# Patient Record
Sex: MALE | Race: WHITE | NOT HISPANIC OR LATINO | Employment: UNEMPLOYED | URBAN - METROPOLITAN AREA
[De-identification: names, ages, dates, MRNs, and addresses within clinical notes are randomized per-mention and may not be internally consistent; named-entity substitution may affect disease eponyms.]

---
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
## 2023-11-05 ENCOUNTER — OFFICE VISIT (OUTPATIENT)
Dept: URGENT CARE | Facility: CLINIC | Age: 23
End: 2023-11-05
Payer: COMMERCIAL

## 2023-11-05 ENCOUNTER — APPOINTMENT (OUTPATIENT)
Dept: RADIOLOGY | Facility: CLINIC | Age: 23
End: 2023-11-05
Payer: COMMERCIAL

## 2023-11-05 VITALS
HEART RATE: 65 BPM | HEIGHT: 68 IN | DIASTOLIC BLOOD PRESSURE: 81 MMHG | SYSTOLIC BLOOD PRESSURE: 127 MMHG | BODY MASS INDEX: 25.01 KG/M2 | RESPIRATION RATE: 18 BRPM | TEMPERATURE: 97.7 F | OXYGEN SATURATION: 97 % | WEIGHT: 165 LBS

## 2023-11-05 DIAGNOSIS — M79.671 RIGHT FOOT PAIN: Primary | ICD-10-CM

## 2023-11-05 DIAGNOSIS — M79.671 RIGHT FOOT PAIN: ICD-10-CM

## 2023-11-05 DIAGNOSIS — S92.521A CLOSED DISPLACED FRACTURE OF MIDDLE PHALANX OF LESSER TOE OF RIGHT FOOT, INITIAL ENCOUNTER: ICD-10-CM

## 2023-11-05 PROCEDURE — 73630 X-RAY EXAM OF FOOT: CPT

## 2023-11-05 PROCEDURE — 99213 OFFICE O/P EST LOW 20 MIN: CPT

## 2023-11-05 NOTE — PATIENT INSTRUCTIONS
Keep the toe buddy taped for support  Wear a hard soled shoe for support. Apply ice for 20 minutes every 1-2 hours while awake.    Take ibuprofen as needed for pain  Follow-up with orthopedic

## 2023-11-05 NOTE — PROGRESS NOTES
[facility]        NAME: Savage Romero is a 21 y.o. male  : 2000    MRN: [id number]  DATE: 2023  TIME: 3:09 PM    Assessment and Plan   Right foot pain [M79.671]  1. Right foot pain  XR foot 3+ vw right      2. Closed displaced fracture of middle phalanx of lesser toe of right foot, initial encounter  Ambulatory Referral to Orthopedic Surgery            Patient Instructions   Keep the toe buddy taped for support  Wear a hard soled shoe for support. Apply ice for 20 minutes every 1-2 hours while awake. Take ibuprofen as needed for pain  Follow-up with orthopedic    Follow up with PCP in 3-5 days. Proceed to  ER if symptoms worsen. Chief Complaint     Chief Complaint   Patient presents with    Toe Pain     Pt reports kicking a box full of tiles with his R foot, injuring his right second toe. History of Present Illness       Patient is a 19YOM presenting with right second toe pain. He states he kicked a box full of tiles this morning and has had increased pain and swelling in his right second toe ever since. Toe Pain         Review of Systems   Review of Systems   Musculoskeletal:  Positive for joint swelling.          Current Medications       Current Outpatient Medications:     predniSONE 10 mg tablet, Take once daily all days pills on this schedule 6- 6- 5- 4- 3- 2- 1 (Patient not taking: Reported on 2019), Disp: 27 tablet, Rfl: 0    Current Allergies     Allergies as of 2023 - Reviewed 2023   Allergen Reaction Noted    Cat hair extract  2019    Dog epithelium  2019            The following portions of the patient's history were reviewed and updated as appropriate: allergies, current medications, past family history, past medical history, past social history, past surgical history and problem list.     Past Medical History:   Diagnosis Date    No known problems        Past Surgical History:   Procedure Laterality Date    NO PAST SURGERIES      TONSILLECTOMY      WISDOM TOOTH EXTRACTION         Family History   Problem Relation Age of Onset    No Known Problems Mother     Diabetes Father          Medications have been verified. Objective   /81   Pulse 65   Temp 97.7 °F (36.5 °C)   Resp 18   Ht 5' 8" (1.727 m)   Wt 74.8 kg (165 lb)   SpO2 97%   BMI 25.09 kg/m²        Physical Exam     Physical Exam  Vitals and nursing note reviewed. Constitutional:       General: He is not in acute distress. Appearance: Normal appearance. He is normal weight. He is not ill-appearing or toxic-appearing. Musculoskeletal:        Legs:    Neurological:      Mental Status: He is alert.

## 2023-11-07 ENCOUNTER — TELEPHONE (OUTPATIENT)
Age: 23
End: 2023-11-07

## 2023-11-07 NOTE — TELEPHONE ENCOUNTER
Caller: Eda Gentile    Doctor/Office: Dr. Bk Yates    #: [phone]    Escalation: Appointment Patient has a broken toe and is requesting a forced appt. Please return call.  Thank you

## 2023-11-16 RX ORDER — LEVOTHYROXINE SODIUM 0.07 MG/1
75 TABLET ORAL
COMMUNITY

## 2023-11-16 RX ORDER — FEXOFENADINE HCL 180 MG/1
180 TABLET ORAL DAILY PRN
COMMUNITY

## 2023-11-20 ENCOUNTER — OFFICE VISIT (OUTPATIENT)
Dept: PODIATRY | Age: 23
End: 2023-11-20
Payer: COMMERCIAL

## 2023-11-20 ENCOUNTER — HOSPITAL ENCOUNTER (OUTPATIENT)
Dept: RADIOLOGY | Facility: CLINIC | Age: 23
Discharge: HOME | End: 2023-11-20
Payer: COMMERCIAL

## 2023-11-20 VITALS
OXYGEN SATURATION: 97 % | SYSTOLIC BLOOD PRESSURE: 112 MMHG | DIASTOLIC BLOOD PRESSURE: 76 MMHG | TEMPERATURE: 98.4 F | HEART RATE: 77 BPM | WEIGHT: 171.6 LBS | HEIGHT: 69 IN | BODY MASS INDEX: 25.42 KG/M2

## 2023-11-20 DIAGNOSIS — S92.514A CLOSED NONDISPLACED FRACTURE OF PROXIMAL PHALANX OF LESSER TOE OF RIGHT FOOT, INITIAL ENCOUNTER: ICD-10-CM

## 2023-11-20 DIAGNOSIS — S92.521A CLOSED DISPLACED FRACTURE OF MIDDLE PHALANX OF LESSER TOE OF RIGHT FOOT, INITIAL ENCOUNTER: ICD-10-CM

## 2023-11-20 PROCEDURE — 99203 OFFICE O/P NEW LOW 30 MIN: CPT | Performed by: STUDENT IN AN ORGANIZED HEALTH CARE EDUCATION/TRAINING PROGRAM

## 2023-11-20 PROCEDURE — 73630 X-RAY EXAM OF FOOT: CPT

## 2023-11-20 NOTE — PROGRESS NOTES
Assessment/Plan:     Diagnoses and all orders for this visit:    Closed nondisplaced fracture of proximal phalanx of lesser toe of right foot, initial encounter  -     Ambulatory Referral to Orthopedic Surgery  -     X-ray foot right 3+ views; Future    Other orders  -     levothyroxine 75 mcg tablet; Take 75 mcg by mouth daily in the early morning (Patient not taking: Reported on 11/20/2023)  -     fexofenadine (ALLEGRA) 180 MG tablet; Take 180 mg by mouth daily as needed  -     aspirin-acetaminophen-caffeine (EXCEDRIN MIGRAINE) 550-588-73 MG per tablet; Take 1 tablet by mouth every 6 (six) hours as needed for headaches          Imaging Reviewed at this visit (I personally reviewed/independently interpreted images and reports in PACS)  XR right foot NWB 3v 11/5/23: No acute osseous abnormalities noted per radiology read however I do note subtle radiolucent line to head of proximal phalanx 2nd toe likely indicating nondisplaced fracture. XR right foot WB 3v 11/20/23: blurring of fracture line to head of proximal phalanx 2nd toe     IMPRESSION:  Right foot injury (DOI 11/5/23) with nondisplaced fracture of right 2nd toe proximal phalanx     PLAN:  I reviewed clinical exam and radiographic imaging (XR) with patient in detail today. I have discussed with the patient the pathophysiology of this diagnosis and reviewed how the examination correlates with this diagnosis. I reviewed urgent care note from 11/5/23  I discussed Xrs as above with patient. I explained healing timeline of toe fractures  C/w buddy taping and WBAT surgical shoe until his pain has improved enough to wear regular sneaker. I gave stiff toed sneaker recs for him today. RICE   F/u 6 weeks if pain still present, WB XR     Subjective:      Patient ID: Clarisa Key is a 21 y.o. male. Natacha Reyes presents to clinic today concerning possible right toe fracture. Notes he kicked a box around 11/5/23 and has pain since.  Went to urgent care 11/5/23 and XR taken w/ possible fracture right 2nd toe. Pain improving a bit and wearing surgical shoe. No pain in shoe, pain with barefoot WB. The following portions of the patient's history were reviewed and updated as appropriate: allergies, current medications, past family history, past medical history, past social history, past surgical history, and problem list.    Review of Systems   Constitutional:  Negative for activity change, chills and fever. HENT: Negative. Respiratory:  Negative for cough, chest tightness and shortness of breath. Cardiovascular:  Negative for chest pain and leg swelling. Endocrine: Negative. Genitourinary: Negative. Musculoskeletal:         R2 pain   Neurological: Negative. Negative for numbness. Psychiatric/Behavioral: Negative. Negative for agitation and behavioral problems. Objective:      /76   Pulse 77   Temp 98.4 °F (36.9 °C) (Temporal)   Ht 5' 9" (1.753 m)   Wt 77.8 kg (171 lb 9.6 oz)   SpO2 97%   BMI 25.34 kg/m²          Physical Exam  Constitutional:       General: He is not in acute distress. Appearance: Normal appearance. He is not ill-appearing. Cardiovascular:      Pulses: Normal pulses. Comments: Bilateral DP & PT pulses 2/4. CRT WNL. Pedal hair present. Feet warm and well perfused. Pulmonary:      Effort: No respiratory distress. Musculoskeletal:         General: Tenderness (Right 2nd toe at DIPJ and proximal phalanx head. Soreness to dorsal foot bruising.) present. Comments: Bilateral ankle, STJ, TNJ, TMTJ, MTPJ ROM WNL and without pain. LE muscle strength WNL. Skin:     General: Skin is warm. Capillary Refill: Capillary refill takes less than 2 seconds. Findings: Bruising (Resolving right dorsal foot.) present. No erythema or lesion. Neurological:      General: No focal deficit present. Mental Status: He is alert and oriented to person, place, and time. Sensory: No sensory deficit. Comments: Gross sensation intact. Denies N/T/B to B/L feet.     Psychiatric:         Mood and Affect: Mood normal.         Behavior: Behavior normal.

## 2023-11-20 NOTE — PATIENT INSTRUCTIONS
Wear supportive shoes at all times. Avoid flip-flops, flat sandals, barefoot walking (never walk barefoot, even at home). Generally avoid shoes that are too flexible and bend in the arch. Your shoes should only slightly bend in the toe area, not the middle. Running sneakers are often the best choice. Supportive sneaker brands: Herrera Mack, [address] [facility], [facility], [address] [facility] (discount if mention Dr referred)  [address] [place] [address] Running [employer] [place]  Supportive daily shoe brands: Vionic, SUND, [place], Dansko, [place], [place], [place], [employer]  Supportive home shoes:  Shikha Polanco (recovery slides)

## 2024-01-03 ENCOUNTER — OFFICE VISIT (OUTPATIENT)
Dept: PODIATRY | Age: 24
End: 2024-01-03
Payer: COMMERCIAL

## 2024-01-03 ENCOUNTER — HOSPITAL ENCOUNTER (OUTPATIENT)
Dept: RADIOLOGY | Facility: CLINIC | Age: 24
Discharge: HOME | End: 2024-01-03
Payer: COMMERCIAL

## 2024-01-03 VITALS
WEIGHT: 170.2 LBS | BODY MASS INDEX: 25.21 KG/M2 | HEIGHT: 69 IN | OXYGEN SATURATION: 97 % | DIASTOLIC BLOOD PRESSURE: 88 MMHG | TEMPERATURE: 98 F | HEART RATE: 62 BPM | SYSTOLIC BLOOD PRESSURE: 118 MMHG

## 2024-01-03 DIAGNOSIS — S92.514D CLOSED NONDISPLACED FRACTURE OF PROXIMAL PHALANX OF LESSER TOE OF RIGHT FOOT WITH ROUTINE HEALING, SUBSEQUENT ENCOUNTER: Primary | ICD-10-CM

## 2024-01-03 DIAGNOSIS — S92.514A CLOSED NONDISPLACED FRACTURE OF PROXIMAL PHALANX OF LESSER TOE OF RIGHT FOOT, INITIAL ENCOUNTER: ICD-10-CM

## 2024-01-03 PROCEDURE — 73630 X-RAY EXAM OF FOOT: CPT

## 2024-01-03 PROCEDURE — 99212 OFFICE O/P EST SF 10 MIN: CPT | Performed by: STUDENT IN AN ORGANIZED HEALTH CARE EDUCATION/TRAINING PROGRAM

## 2024-01-03 NOTE — PROGRESS NOTES
Assessment/Plan:     Diagnoses and all orders for this visit:    Closed nondisplaced fracture of proximal phalanx of lesser toe of right foot with routine healing, subsequent encounter  -     X-ray foot right 3+ views; Future          Imaging Reviewed at this visit (I personally reviewed/independently interpreted images and reports in PACS)  XR right foot WB 3v 1/3/24: healing nondisplaced fracture of 2nd toe proximal phalanx    Prior Imaging  XR right foot NWB 3v 11/5/23: No acute osseous abnormalities noted per radiology read however I do note subtle radiolucent line to head of proximal phalanx 2nd toe likely indicating nondisplaced fracture.    XR right foot WB 3v 11/20/23: blurring of fracture line to head of proximal phalanx 2nd toe     IMPRESSION:  Right foot injury (DOI 11/5/23) with nondisplaced fracture of right 2nd toe proximal phalanx     PLAN:  Pain with some improvement and now only with PF PIPJ. I discussed Xr as above with patient. I explained healing timeline of toe fractures  C/w luigi taping and WBAT surgical shoe until his pain has improved enough to wear regular sneaker. I gave stiff toed sneaker recs for him today.  F/u 6 weeks, XR R2nd toe    Subjective:      Patient ID: Josh Braun is a 23 y.o. male.    Josh presents to clinic today concerning Right 2nd toe fracture sustained 11/5/23. Able to WB in shoe without issue. Notes soreness with PF toe at joint.        The following portions of the patient's history were reviewed and updated as appropriate: allergies, current medications, past family history, past medical history, past social history, past surgical history, and problem list.    Review of Systems   Constitutional:  Negative for activity change, chills and fever.   HENT: Negative.     Respiratory:  Negative for cough, chest tightness and shortness of breath.    Cardiovascular:  Negative for chest pain and leg swelling.   Endocrine: Negative.    Genitourinary: Negative.   "  Musculoskeletal:         R2 pain   Neurological: Negative.  Negative for numbness.   Psychiatric/Behavioral: Negative.  Negative for agitation and behavioral problems.          Objective:      /88   Pulse 62   Temp 98 °F (36.7 °C) (Temporal)   Ht 5' 9\" (1.753 m)   Wt 77.2 kg (170 lb 3.2 oz)   SpO2 97%   BMI 25.13 kg/m²          Physical Exam  Constitutional:       General: He is not in acute distress.     Appearance: Normal appearance. He is not ill-appearing.   Cardiovascular:      Pulses: Normal pulses.      Comments: Bilateral DP & PT pulses 2/4. CRT WNL. Pedal hair present. Feet warm and well perfused.   Pulmonary:      Effort: No respiratory distress.   Musculoskeletal:         General: Tenderness (Right 2nd toe at PIPJ and proximal phalanx head, slight. more with PF PIPJ.) present.      Comments: Bilateral ankle, STJ, TNJ, TMTJ, MTPJ ROM WNL and without pain. LE muscle strength WNL. Resolved soreness to dorsal foot bruising.    Skin:     General: Skin is warm.      Capillary Refill: Capillary refill takes less than 2 seconds.      Findings: No bruising (Resolved right dorsal foot.), erythema or lesion.   Neurological:      General: No focal deficit present.      Mental Status: He is alert and oriented to person, place, and time.      Sensory: No sensory deficit.      Comments: Gross sensation intact. Denies N/T/B to B/L feet.    Psychiatric:         Mood and Affect: Mood normal.         Behavior: Behavior normal.           "